# Patient Record
Sex: FEMALE | Race: ASIAN | NOT HISPANIC OR LATINO | URBAN - METROPOLITAN AREA
[De-identification: names, ages, dates, MRNs, and addresses within clinical notes are randomized per-mention and may not be internally consistent; named-entity substitution may affect disease eponyms.]

---

## 2017-09-08 ENCOUNTER — OUTPATIENT (OUTPATIENT)
Dept: OUTPATIENT SERVICES | Facility: HOSPITAL | Age: 52
LOS: 1 days | End: 2017-09-08
Payer: COMMERCIAL

## 2017-09-08 ENCOUNTER — APPOINTMENT (OUTPATIENT)
Dept: RADIOLOGY | Facility: HOSPITAL | Age: 52
End: 2017-09-08

## 2017-09-08 DIAGNOSIS — I10 ESSENTIAL (PRIMARY) HYPERTENSION: ICD-10-CM

## 2017-09-08 PROCEDURE — 71010: CPT | Mod: 26

## 2018-06-26 ENCOUNTER — APPOINTMENT (OUTPATIENT)
Dept: ELECTROPHYSIOLOGY | Facility: CLINIC | Age: 53
End: 2018-06-26
Payer: COMMERCIAL

## 2018-06-26 ENCOUNTER — NON-APPOINTMENT (OUTPATIENT)
Age: 53
End: 2018-06-26

## 2018-06-26 VITALS
HEIGHT: 64 IN | WEIGHT: 160 LBS | RESPIRATION RATE: 16 BRPM | OXYGEN SATURATION: 98 % | BODY MASS INDEX: 27.31 KG/M2 | HEART RATE: 69 BPM | DIASTOLIC BLOOD PRESSURE: 76 MMHG | SYSTOLIC BLOOD PRESSURE: 113 MMHG

## 2018-06-26 PROCEDURE — 93000 ELECTROCARDIOGRAM COMPLETE: CPT

## 2018-06-26 PROCEDURE — 99213 OFFICE O/P EST LOW 20 MIN: CPT

## 2018-09-18 ENCOUNTER — OUTPATIENT (OUTPATIENT)
Dept: OUTPATIENT SERVICES | Facility: HOSPITAL | Age: 53
LOS: 1 days | End: 2018-09-18

## 2018-09-18 ENCOUNTER — APPOINTMENT (OUTPATIENT)
Dept: CV DIAGNOSITCS | Facility: HOSPITAL | Age: 53
End: 2018-09-18
Payer: COMMERCIAL

## 2018-09-18 DIAGNOSIS — I48.92 UNSPECIFIED ATRIAL FLUTTER: ICD-10-CM

## 2018-09-18 PROCEDURE — 93306 TTE W/DOPPLER COMPLETE: CPT | Mod: 26

## 2019-02-15 ENCOUNTER — APPOINTMENT (OUTPATIENT)
Dept: ELECTROPHYSIOLOGY | Facility: CLINIC | Age: 54
End: 2019-02-15
Payer: COMMERCIAL

## 2019-02-15 ENCOUNTER — NON-APPOINTMENT (OUTPATIENT)
Age: 54
End: 2019-02-15

## 2019-02-15 VITALS
BODY MASS INDEX: 26.63 KG/M2 | WEIGHT: 156 LBS | RESPIRATION RATE: 16 BRPM | HEART RATE: 92 BPM | OXYGEN SATURATION: 98 % | SYSTOLIC BLOOD PRESSURE: 137 MMHG | DIASTOLIC BLOOD PRESSURE: 82 MMHG | HEIGHT: 64 IN

## 2019-02-15 PROCEDURE — 93000 ELECTROCARDIOGRAM COMPLETE: CPT

## 2019-02-15 PROCEDURE — 99213 OFFICE O/P EST LOW 20 MIN: CPT

## 2019-02-15 NOTE — DISCUSSION/SUMMARY
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 Patient's medications changed because of bradycardia which was likely due to interactions with other meds given.  Patient found to have high Hbg A1C and was considered diabetic which would increase CHADS2 vasc score to 4 (was previously 1 before CVA and DM diagnosis).  She is currently asymptomatic, except for some slowness with memory retrieval and speech.  Recommend diltiazem 240 daily and metoprolol 25 mg daily and Eliquis 5 mg twice daily. \par \par Return to work in 8 weeks. \par \par Sincerely,\par \par Justice Herrera MD\par \par Still pauses to speak, but 90 % of baseline.

## 2019-02-15 NOTE — HISTORY OF PRESENT ILLNESS
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 Patient's medications changed because of bradycardia which was likely due to interactions with other meds given.  Patient found to have high Hbg A1C and was considered diabetic which would increase CHADS2 vasc score to 4 (was previously 1 before CVA and DM diagnosis).  She is currently asymptomatic, except for some slowness with memory retrieval and speech.

## 2019-04-19 ENCOUNTER — APPOINTMENT (OUTPATIENT)
Dept: ELECTROPHYSIOLOGY | Facility: CLINIC | Age: 54
End: 2019-04-19
Payer: COMMERCIAL

## 2019-04-19 ENCOUNTER — NON-APPOINTMENT (OUTPATIENT)
Age: 54
End: 2019-04-19

## 2019-04-19 VITALS
HEIGHT: 64 IN | OXYGEN SATURATION: 98 % | BODY MASS INDEX: 26.46 KG/M2 | WEIGHT: 155 LBS | HEART RATE: 71 BPM | RESPIRATION RATE: 16 BRPM | DIASTOLIC BLOOD PRESSURE: 76 MMHG | SYSTOLIC BLOOD PRESSURE: 124 MMHG

## 2019-04-19 PROCEDURE — 93000 ELECTROCARDIOGRAM COMPLETE: CPT

## 2019-04-19 PROCEDURE — 99214 OFFICE O/P EST MOD 30 MIN: CPT

## 2019-04-19 RX ORDER — METFORMIN HYDROCHLORIDE 500 MG/1
500 TABLET, COATED ORAL
Refills: 0 | Status: ACTIVE | COMMUNITY
Start: 2019-04-19

## 2019-04-19 NOTE — DISCUSSION/SUMMARY
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 Patient's medications changed because of bradycardia which was likely due to interactions with other meds given. Patient found to have high Hbg A1C and was considered diabetic which would increase CHADS2 vasc score to 4 (was previously 1 before CVA and DM diagnosis). Did have some slowness with memory retrieval and speech, but that is resolved. She feels well and is asymptomatic with no chest pain, dyspnea, palpitations, lightheadedness, syncope or near syncope. Hears loud noise which is bothersome.   Will stay on AC, CCB and BB and will f/u in 6 months. She will undergone an echocardiogram, Will keep same dose of statin in light of history of stroke and NEM data regarding high dose statin and stroke. \par \par Sincerely,\par \par Justice Herrera MD

## 2019-04-19 NOTE — HISTORY OF PRESENT ILLNESS
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 Patient's medications changed because of bradycardia which was likely due to interactions with other meds given. Patient found to have high Hbg A1C and was considered diabetic which would increase CHADS2 vasc score to 4 (was previously 1 before CVA and DM diagnosis). Did have some slowness with memory retrieval and speech, but that is resolved. She feels well and is asymptomatic with no chest pain, dyspnea, palpitations, lightheadedness, syncope or near syncope. Hears loud noise which is bothersome.

## 2019-04-19 NOTE — PHYSICAL EXAM
[General Appearance - Well Developed] : well developed [Normal Appearance] : normal appearance [Well Groomed] : well groomed [General Appearance - Well Nourished] : well nourished [No Deformities] : no deformities [General Appearance - In No Acute Distress] : no acute distress [Normal Conjunctiva] : the conjunctiva exhibited no abnormalities [Eyelids - No Xanthelasma] : the eyelids demonstrated no xanthelasmas [Normal Oral Mucosa] : normal oral mucosa [No Oral Pallor] : no oral pallor [No Oral Cyanosis] : no oral cyanosis [Normal Jugular Venous A Waves Present] : normal jugular venous A waves present [Normal Jugular Venous V Waves Present] : normal jugular venous V waves present [No Jugular Venous Irene A Waves] : no jugular venous irene A waves [Respiration, Rhythm And Depth] : normal respiratory rhythm and effort [Exaggerated Use Of Accessory Muscles For Inspiration] : no accessory muscle use [Auscultation Breath Sounds / Voice Sounds] : lungs were clear to auscultation bilaterally [Heart Rate And Rhythm] : heart rate and rhythm were normal [Heart Sounds] : normal S1 and S2 [Murmurs] : no murmurs present [Abdomen Soft] : soft [Abdomen Tenderness] : non-tender [Abnormal Walk] : normal gait [Abdomen Mass (___ Cm)] : no abdominal mass palpated [Gait - Sufficient For Exercise Testing] : the gait was sufficient for exercise testing [Nail Clubbing] : no clubbing of the fingernails [Petechial Hemorrhages (___cm)] : no petechial hemorrhages [Cyanosis, Localized] : no localized cyanosis [Skin Color & Pigmentation] : normal skin color and pigmentation [] : no rash [No Venous Stasis] : no venous stasis [Skin Lesions] : no skin lesions [No Xanthoma] : no  xanthoma was observed [No Skin Ulcers] : no skin ulcer [Oriented To Time, Place, And Person] : oriented to person, place, and time [Affect] : the affect was normal [No Anxiety] : not feeling anxious [Mood] : the mood was normal [FreeTextEntry1] : glasses

## 2019-10-23 ENCOUNTER — RX RENEWAL (OUTPATIENT)
Age: 54
End: 2019-10-23

## 2019-11-08 ENCOUNTER — OUTPATIENT (OUTPATIENT)
Dept: OUTPATIENT SERVICES | Facility: HOSPITAL | Age: 54
LOS: 1 days | End: 2019-11-08

## 2019-11-08 ENCOUNTER — APPOINTMENT (OUTPATIENT)
Dept: CV DIAGNOSITCS | Facility: HOSPITAL | Age: 54
End: 2019-11-08
Payer: COMMERCIAL

## 2019-11-08 DIAGNOSIS — I34.0 NONRHEUMATIC MITRAL (VALVE) INSUFFICIENCY: ICD-10-CM

## 2019-11-08 DIAGNOSIS — I48.92 UNSPECIFIED ATRIAL FLUTTER: ICD-10-CM

## 2019-11-08 PROCEDURE — 93306 TTE W/DOPPLER COMPLETE: CPT | Mod: 26

## 2019-11-26 ENCOUNTER — NON-APPOINTMENT (OUTPATIENT)
Age: 54
End: 2019-11-26

## 2019-11-26 ENCOUNTER — APPOINTMENT (OUTPATIENT)
Dept: ELECTROPHYSIOLOGY | Facility: CLINIC | Age: 54
End: 2019-11-26
Payer: COMMERCIAL

## 2019-11-26 VITALS
HEART RATE: 77 BPM | DIASTOLIC BLOOD PRESSURE: 82 MMHG | WEIGHT: 171 LBS | BODY MASS INDEX: 29.19 KG/M2 | OXYGEN SATURATION: 97 % | HEIGHT: 64 IN | SYSTOLIC BLOOD PRESSURE: 127 MMHG

## 2019-11-26 DIAGNOSIS — I48.4 ATYPICAL ATRIAL FLUTTER: ICD-10-CM

## 2019-11-26 DIAGNOSIS — Z86.73 PERSONAL HISTORY OF TRANSIENT ISCHEMIC ATTACK (TIA), AND CEREBRAL INFARCTION W/OUT RESIDUAL DEFICITS: ICD-10-CM

## 2019-11-26 PROCEDURE — 93000 ELECTROCARDIOGRAM COMPLETE: CPT

## 2019-11-26 PROCEDURE — 99213 OFFICE O/P EST LOW 20 MIN: CPT

## 2019-11-26 RX ORDER — PRAVASTATIN SODIUM 80 MG/1
80 TABLET ORAL
Qty: 90 | Refills: 1 | Status: ACTIVE | COMMUNITY
Start: 2019-11-26 | End: 1900-01-01

## 2019-11-26 RX ORDER — ATORVASTATIN CALCIUM 80 MG/1
80 TABLET, FILM COATED ORAL DAILY
Qty: 90 | Refills: 2 | Status: DISCONTINUED | COMMUNITY
Start: 2019-11-26 | End: 2019-11-26

## 2019-11-28 NOTE — PHYSICAL EXAM
[General Appearance - Well Developed] : well developed [Well Groomed] : well groomed [Normal Appearance] : normal appearance [No Deformities] : no deformities [General Appearance - In No Acute Distress] : no acute distress [General Appearance - Well Nourished] : well nourished [Normal Conjunctiva] : the conjunctiva exhibited no abnormalities [Eyelids - No Xanthelasma] : the eyelids demonstrated no xanthelasmas [No Oral Pallor] : no oral pallor [Normal Oral Mucosa] : normal oral mucosa [No Oral Cyanosis] : no oral cyanosis [Normal Jugular Venous A Waves Present] : normal jugular venous A waves present [Normal Jugular Venous V Waves Present] : normal jugular venous V waves present [No Jugular Venous Irene A Waves] : no jugular venous irene A waves [Respiration, Rhythm And Depth] : normal respiratory rhythm and effort [Exaggerated Use Of Accessory Muscles For Inspiration] : no accessory muscle use [Auscultation Breath Sounds / Voice Sounds] : lungs were clear to auscultation bilaterally [Heart Rate And Rhythm] : heart rate and rhythm were normal [Murmurs] : no murmurs present [Heart Sounds] : normal S1 and S2 [Abdomen Tenderness] : non-tender [Abdomen Soft] : soft [Abdomen Mass (___ Cm)] : no abdominal mass palpated [Abnormal Walk] : normal gait [Cyanosis, Localized] : no localized cyanosis [Gait - Sufficient For Exercise Testing] : the gait was sufficient for exercise testing [Nail Clubbing] : no clubbing of the fingernails [Skin Color & Pigmentation] : normal skin color and pigmentation [Petechial Hemorrhages (___cm)] : no petechial hemorrhages [No Venous Stasis] : no venous stasis [] : no rash [Skin Lesions] : no skin lesions [No Xanthoma] : no  xanthoma was observed [No Skin Ulcers] : no skin ulcer [Affect] : the affect was normal [Oriented To Time, Place, And Person] : oriented to person, place, and time [Mood] : the mood was normal [No Anxiety] : not feeling anxious [FreeTextEntry1] : glasses

## 2019-11-28 NOTE — DISCUSSION/SUMMARY
[FreeTextEntry1] : In summary, Edna Denise is a 52y/o woman with Hx of permanent atrial fibrillation/atypical atrial flutter, and CVA who presents today for routine f/u. On last visit, she had recently developed a CVA but received TPA with near complete resolution of symptoms. Has been maintained on diltiazem, metoprolol, and Eliquis. Has been feeling well. Notes some palpitations if she is late to take a dose of diltiazem. Denies chest pain, SOB, syncope or near syncope. EKG today shows persistent afib/aflutter. Resume diltiazem, metoprolol, and Eliquis as prescribed. Rx provided. Continue f/u with Cardiologist and RTO for f/u in 6 months.\par \par Sincerely,\par \par Justice Herrera MD\par

## 2019-11-28 NOTE — HISTORY OF PRESENT ILLNESS
[FreeTextEntry1] : Gissell Cervantes MD\par \par Edna Denise is a 52y/o woman with Hx of permanent atrial fibrillation/atypical atrial flutter, and CVA who presents today for routine f/u. On last visit, she had recently developed a CVA but received TPA with near complete resolution of symptoms. Has been maintained on diltiazem, metoprolol, and Eliquis. Has been feeling well. Notes some palpitations if she is late to take a dose of diltiazem. Denies chest pain, SOB, syncope or near syncope.\par

## 2020-08-24 ENCOUNTER — RX RENEWAL (OUTPATIENT)
Age: 55
End: 2020-08-24

## 2021-02-26 ENCOUNTER — APPOINTMENT (OUTPATIENT)
Dept: ELECTROPHYSIOLOGY | Facility: CLINIC | Age: 56
End: 2021-02-26
Payer: COMMERCIAL

## 2021-02-26 ENCOUNTER — NON-APPOINTMENT (OUTPATIENT)
Age: 56
End: 2021-02-26

## 2021-02-26 VITALS
OXYGEN SATURATION: 98 % | HEART RATE: 85 BPM | BODY MASS INDEX: 28.32 KG/M2 | TEMPERATURE: 96.6 F | DIASTOLIC BLOOD PRESSURE: 78 MMHG | HEIGHT: 64 IN | SYSTOLIC BLOOD PRESSURE: 125 MMHG

## 2021-02-26 VITALS — BODY MASS INDEX: 28.32 KG/M2 | WEIGHT: 165 LBS

## 2021-02-26 DIAGNOSIS — I48.19 OTHER PERSISTENT ATRIAL FIBRILLATION: ICD-10-CM

## 2021-02-26 PROCEDURE — 99072 ADDL SUPL MATRL&STAF TM PHE: CPT

## 2021-02-26 PROCEDURE — 99213 OFFICE O/P EST LOW 20 MIN: CPT

## 2021-02-27 NOTE — DISCUSSION/SUMMARY
[FreeTextEntry1] : Edna Denise is a 56y/o woman with Hx of HLD, DMII, permanent atrial fibrillation/atypical atrial flutter, and CVA who presents today for routine f/u. \par \par Impression:\par \par 1. Permanent afib: EKG performed today to assess for presence of afib and reveals afib well rate controlled. [id number] score 4. Resume Eliquis for thromboembolic prophylaxis as prescribed and diltiazem/metoprolol for rate control management. No recent ECHO, last one in 2019 with LVEF 63%. Rx for ECHO provided. \par \par 2. HLD: resume statin therapy as prescribed and regular f/u with Cardiologist for routine lipid monitoring and management.\par \par 3. DMII: resume oral antihyperglycemic agents as prescribed and regular f/u with PCP for routine glucose monitoring and management.\par \par Sincerely,\par \par Justice Herrera MD

## 2021-02-27 NOTE — HISTORY OF PRESENT ILLNESS
[FreeTextEntry1] : Gissell Cervantes MD\par \par Edna Denise is a 54y/o woman with Hx of HLD, DMII, permanent atrial fibrillation/atypical atrial flutter, and CVA who presents today for routine f/u. On last visit, she had recently developed a CVA but received TPA with near complete resolution of symptoms. Has been maintained on diltiazem, metoprolol, and Eliquis. Has been feeling well. Notes some palpitations if she is late to take a dose of diltiazem. Did have one day at work where she flet palpitations and noted her heart rate to be in the 100s after eating a large lunch and feeling kind of stressed. Took an extra dose of metoprolol at that time with relief. Denies chest pain, SOB, syncope or near syncope.\par

## 2021-02-27 NOTE — PHYSICAL EXAM
[General Appearance - Well Developed] : well developed [Normal Appearance] : normal appearance [Well Groomed] : well groomed [General Appearance - Well Nourished] : well nourished [No Deformities] : no deformities [General Appearance - In No Acute Distress] : no acute distress [Normal Conjunctiva] : the conjunctiva exhibited no abnormalities [Eyelids - No Xanthelasma] : the eyelids demonstrated no xanthelasmas [Normal Oral Mucosa] : normal oral mucosa [No Oral Pallor] : no oral pallor [No Oral Cyanosis] : no oral cyanosis [Normal Jugular Venous A Waves Present] : normal jugular venous A waves present [Normal Jugular Venous V Waves Present] : normal jugular venous V waves present [No Jugular Venous Irene A Waves] : no jugular venous irene A waves [Respiration, Rhythm And Depth] : normal respiratory rhythm and effort [Exaggerated Use Of Accessory Muscles For Inspiration] : no accessory muscle use [Auscultation Breath Sounds / Voice Sounds] : lungs were clear to auscultation bilaterally [Heart Rate And Rhythm] : heart rate and rhythm were normal [Heart Sounds] : normal S1 and S2 [Murmurs] : no murmurs present [Abdomen Soft] : soft [Abdomen Tenderness] : non-tender [Abdomen Mass (___ Cm)] : no abdominal mass palpated [Abnormal Walk] : normal gait [Gait - Sufficient For Exercise Testing] : the gait was sufficient for exercise testing [Nail Clubbing] : no clubbing of the fingernails [Cyanosis, Localized] : no localized cyanosis [Petechial Hemorrhages (___cm)] : no petechial hemorrhages [Skin Color & Pigmentation] : normal skin color and pigmentation [] : no rash [No Venous Stasis] : no venous stasis [Skin Lesions] : no skin lesions [No Skin Ulcers] : no skin ulcer [No Xanthoma] : no  xanthoma was observed [Oriented To Time, Place, And Person] : oriented to person, place, and time [Affect] : the affect was normal [Mood] : the mood was normal [No Anxiety] : not feeling anxious [FreeTextEntry1] : glasses

## 2021-03-22 ENCOUNTER — APPOINTMENT (OUTPATIENT)
Dept: CV DIAGNOSITCS | Facility: HOSPITAL | Age: 56
End: 2021-03-22
Payer: COMMERCIAL

## 2021-03-22 ENCOUNTER — OUTPATIENT (OUTPATIENT)
Dept: OUTPATIENT SERVICES | Facility: HOSPITAL | Age: 56
LOS: 1 days | End: 2021-03-22

## 2021-03-22 DIAGNOSIS — I48.19 OTHER PERSISTENT ATRIAL FIBRILLATION: ICD-10-CM

## 2021-03-22 PROCEDURE — 93306 TTE W/DOPPLER COMPLETE: CPT | Mod: 26

## 2021-06-18 ENCOUNTER — APPOINTMENT (OUTPATIENT)
Dept: ELECTROPHYSIOLOGY | Facility: CLINIC | Age: 56
End: 2021-06-18

## 2021-10-08 ENCOUNTER — NON-APPOINTMENT (OUTPATIENT)
Age: 56
End: 2021-10-08

## 2021-10-08 ENCOUNTER — RESULT REVIEW (OUTPATIENT)
Age: 56
End: 2021-10-08

## 2021-10-08 ENCOUNTER — APPOINTMENT (OUTPATIENT)
Dept: ELECTROPHYSIOLOGY | Facility: CLINIC | Age: 56
End: 2021-10-08
Payer: COMMERCIAL

## 2021-10-08 VITALS — SYSTOLIC BLOOD PRESSURE: 147 MMHG | HEIGHT: 64 IN | BODY MASS INDEX: 27.29 KG/M2 | DIASTOLIC BLOOD PRESSURE: 94 MMHG

## 2021-10-08 VITALS — DIASTOLIC BLOOD PRESSURE: 103 MMHG | SYSTOLIC BLOOD PRESSURE: 142 MMHG | OXYGEN SATURATION: 98 % | HEART RATE: 101 BPM

## 2021-10-08 VITALS — WEIGHT: 159 LBS | BODY MASS INDEX: 27.29 KG/M2

## 2021-10-08 DIAGNOSIS — F41.9 ANXIETY DISORDER, UNSPECIFIED: ICD-10-CM

## 2021-10-08 PROCEDURE — 99214 OFFICE O/P EST MOD 30 MIN: CPT

## 2021-10-08 PROCEDURE — 93000 ELECTROCARDIOGRAM COMPLETE: CPT

## 2021-10-09 NOTE — HISTORY OF PRESENT ILLNESS
[FreeTextEntry1] : Gissell Cervantes MD\par \par Edna Denise is a 56y/o woman with Hx of HLD, DMII,atypical atrial flutter, and CVA who presents today for routine f/u.  She previously developed a CVA but received TPA with near complete resolution of symptoms. Has been maintained on diltiazem, metoprolol, and Eliquis. Has been feeling well. Notes some palpitations if she is late to take a dose of diltiazem. Did have one day at work where she felt palpitations and noted her heart rate to be in the 100s after eating a large lunch and feeling kind of stressed. Took an extra dose of metoprolol at that time with relief. Denies chest pain, SOB, syncope or near syncope. Has been diagnosed with DCIS (ductal carcinoma in situ) and previous biopsy was complicated by hematoma. Scheduled for mastectomy 10/22.

## 2021-10-09 NOTE — PHYSICAL EXAM
[Well Developed] : well developed [Well Nourished] : well nourished [No Acute Distress] : no acute distress [Normal Venous Pressure] : normal venous pressure [No Carotid Bruit] : no carotid bruit [Normal S1, S2] : normal S1, S2 [No Murmur] : no murmur [No Rub] : no rub [No Gallop] : no gallop [Clear Lung Fields] : clear lung fields [Good Air Entry] : good air entry [No Respiratory Distress] : no respiratory distress  [Soft] : abdomen soft [Non Tender] : non-tender [No Masses/organomegaly] : no masses/organomegaly [Normal Bowel Sounds] : normal bowel sounds [Normal Gait] : normal gait [No Edema] : no edema [No Cyanosis] : no cyanosis [No Clubbing] : no clubbing [No Varicosities] : no varicosities [No Rash] : no rash [No Skin Lesions] : no skin lesions [Moves all extremities] : moves all extremities [No Focal Deficits] : no focal deficits [Normal Speech] : normal speech [Alert and Oriented] : alert and oriented [Normal memory] : normal memory [General Appearance - Well Developed] : well developed [Normal Appearance] : normal appearance [Well Groomed] : well groomed [General Appearance - Well Nourished] : well nourished [No Deformities] : no deformities [General Appearance - In No Acute Distress] : no acute distress [Normal Conjunctiva] : the conjunctiva exhibited no abnormalities [Eyelids - No Xanthelasma] : the eyelids demonstrated no xanthelasmas [Normal Oral Mucosa] : normal oral mucosa [No Oral Pallor] : no oral pallor [No Oral Cyanosis] : no oral cyanosis [Normal Jugular Venous A Waves Present] : normal jugular venous A waves present [Normal Jugular Venous V Waves Present] : normal jugular venous V waves present [No Jugular Venous Irene A Waves] : no jugular venous irene A waves [Respiration, Rhythm And Depth] : normal respiratory rhythm and effort [Exaggerated Use Of Accessory Muscles For Inspiration] : no accessory muscle use [Auscultation Breath Sounds / Voice Sounds] : lungs were clear to auscultation bilaterally [Heart Rate And Rhythm] : heart rate and rhythm were normal [Heart Sounds] : normal S1 and S2 [Murmurs] : no murmurs present [Abdomen Soft] : soft [Abdomen Tenderness] : non-tender [Abdomen Mass (___ Cm)] : no abdominal mass palpated [Abnormal Walk] : normal gait [Gait - Sufficient For Exercise Testing] : the gait was sufficient for exercise testing [Nail Clubbing] : no clubbing of the fingernails [Cyanosis, Localized] : no localized cyanosis [Petechial Hemorrhages (___cm)] : no petechial hemorrhages [Skin Color & Pigmentation] : normal skin color and pigmentation [] : no rash [No Venous Stasis] : no venous stasis [Skin Lesions] : no skin lesions [No Skin Ulcers] : no skin ulcer [No Xanthoma] : no  xanthoma was observed [Oriented To Time, Place, And Person] : oriented to person, place, and time [Affect] : the affect was normal [Mood] : the mood was normal [No Anxiety] : not feeling anxious [FreeTextEntry1] : glasses

## 2021-10-09 NOTE — DISCUSSION/SUMMARY
[FreeTextEntry1] : Edna Denise is a 54y/o woman with Hx of HLD, DMII, permanent atrial fibrillation/atypical atrial flutter, and CVA who presents today for routine f/u. \par \par Impression:\par \par 1. Atypical atrial flutter (likely mitral annular flutter): EKG performed today to assess for presence of afib and reveals atypical atrial flutter with well rate controlled. [id number] score 4. Resume Eliquis for thromboembolic prophylaxis as prescribed and diltiazem/metoprolol for rate control management. No recent ECHO, last one in 2019 with LVEF 63%. Rx for ECHO provided. Dr. York: ([phone] fax) ([phone] office number)\par \par 2. HLD: resume statin therapy as prescribed and regular f/u with Cardiologist for routine lipid monitoring and management.\par \par 3. DMII: resume oral antihyperglycemic agents as prescribed and regular f/u with PCP for routine glucose monitoring and management.\par \par 4. Anxiety. Xanax to get her through this month until surgery performed. \par \par 5. DCIS: Patient has decided to undergo mastectomy because she is anxious about recurrence.  She is going to also inquire about double mastectomy.  She will have the procedure performed at [facility] on October 22 and she will likely stay over night pain management. She will stop AC 2 days before and restart anticoagulation as soon as possible post procedure. Patient at acceptable risk for procedure because of magnitude of adverse outcome if DCIS is not removed.  Patient is at risk for CVA the longer she is off AC and therefore it is imperative that anticoagulation is started as soon as possible post procedure. \par \par Brooklynn York: breast surgeon. \par \par \par Sincerely,\par \par Justice Herrera MD

## 2021-10-12 ENCOUNTER — OUTPATIENT (OUTPATIENT)
Dept: OUTPATIENT SERVICES | Facility: HOSPITAL | Age: 56
LOS: 1 days | End: 2021-10-12

## 2021-10-12 VITALS
HEIGHT: 62 IN | HEART RATE: 68 BPM | SYSTOLIC BLOOD PRESSURE: 134 MMHG | WEIGHT: 158.07 LBS | OXYGEN SATURATION: 99 % | TEMPERATURE: 97 F | RESPIRATION RATE: 16 BRPM | DIASTOLIC BLOOD PRESSURE: 80 MMHG

## 2021-10-12 DIAGNOSIS — D05.12 INTRADUCTAL CARCINOMA IN SITU OF LEFT BREAST: ICD-10-CM

## 2021-10-12 DIAGNOSIS — I48.91 UNSPECIFIED ATRIAL FIBRILLATION: ICD-10-CM

## 2021-10-12 DIAGNOSIS — D05.10 INTRADUCTAL CARCINOMA IN SITU OF UNSPECIFIED BREAST: ICD-10-CM

## 2021-10-12 DIAGNOSIS — Z98.890 OTHER SPECIFIED POSTPROCEDURAL STATES: Chronic | ICD-10-CM

## 2021-10-12 DIAGNOSIS — E11.9 TYPE 2 DIABETES MELLITUS WITHOUT COMPLICATIONS: ICD-10-CM

## 2021-10-12 NOTE — H&P PST ADULT - PROBLEM SELECTOR PLAN 2
Pending Cardiac eval as per surgeon request-copy in chart.  Last day of eliquis on 10/19/21 as per cardiologist. Patient verbalized understanding.  Patient instructed to take metoprolol with a sip of water on the morning of procedure.

## 2021-10-12 NOTE — H&P PST ADULT - NSICDXPASTMEDICALHX_GEN_ALL_CORE_FT
PAST MEDICAL HISTORY:  Afib     CVA (cerebrovascular accident) 2019 no residue    Hyperlipidemia     Lumbar scoliosis

## 2021-10-12 NOTE — H&P PST ADULT - PROBLEM SELECTOR PLAN 1
Patient tentatively scheduled for  left mastectomy left sentinel node biopsy 10/22/21.  Pre-op instructions provided. Pt given verbal and written instructions with teach back on chlorhexidine shampoo and pepcid. Pt verbalized understanding with return demonstration.   Patient instructed to call surgeon's office to schedule a COVID-19 test  prior to procedure.

## 2021-10-12 NOTE — H&P PST ADULT - GENITOURINARY
Review of Systems   Constitutional: Negative  HENT: Positive for tinnitus  Negative for hearing loss  Eyes: Negative  Respiratory: Negative  Cardiovascular: Negative  Gastrointestinal: Negative  Endocrine: Negative  Genitourinary: Negative  Musculoskeletal: Negative  Skin: Negative  Allergic/Immunologic: Negative  Neurological: Negative  Hematological: Negative  Psychiatric/Behavioral: Negative  details…

## 2021-10-12 NOTE — H&P PST ADULT - PROBLEM SELECTOR PLAN 3
PMH of DM  Patient instructed to hold Metformin the night before and the morning of procedure. Pt stated understanding.

## 2021-10-12 NOTE — H&P PST ADULT - NSANTHOSAYNRD_GEN_A_CORE
No. REGGIE screening performed.  STOP BANG Legend: 0-2 = LOW Risk; 3-4 = INTERMEDIATE Risk; 5-8 = HIGH Risk

## 2021-10-12 NOTE — H&P PST ADULT - HISTORY OF PRESENT ILLNESS
55 year old male with PMH of  HLD, Type 2 DM, Afib , CVA (2019 no residue)  presents to Presurgical testing with diagnosis of intraductal carcinoma insitu of left breast scheduled for left mastectomy left sentinel node biopsy. Patient reports that she had her regular mammogram states  that found to have DCIS.     Patient currently on antibiotics s/p tooth extraction last day today.

## 2021-10-12 NOTE — H&P PST ADULT - NSICDXPASTSURGICALHX_GEN_ALL_CORE_FT
PAST SURGICAL HISTORY:  S/P ablation of atrial flutter 2011    S/P CLARISSE (Total Abdominal Hysterectomy)

## 2021-10-13 PROBLEM — I48.91 UNSPECIFIED ATRIAL FIBRILLATION: Chronic | Status: ACTIVE | Noted: 2021-10-12

## 2021-10-13 PROBLEM — E78.5 HYPERLIPIDEMIA, UNSPECIFIED: Chronic | Status: ACTIVE | Noted: 2021-10-12

## 2021-10-13 PROBLEM — M41.9 SCOLIOSIS, UNSPECIFIED: Chronic | Status: ACTIVE | Noted: 2021-10-12

## 2021-10-13 PROBLEM — I63.9 CEREBRAL INFARCTION, UNSPECIFIED: Chronic | Status: ACTIVE | Noted: 2021-10-12

## 2021-10-18 DIAGNOSIS — Z01.818 ENCOUNTER FOR OTHER PREPROCEDURAL EXAMINATION: ICD-10-CM

## 2021-10-19 ENCOUNTER — APPOINTMENT (OUTPATIENT)
Dept: DISASTER EMERGENCY | Facility: CLINIC | Age: 56
End: 2021-10-19

## 2021-10-20 LAB — SARS-COV-2 N GENE NPH QL NAA+PROBE: NOT DETECTED

## 2021-10-21 ENCOUNTER — TRANSCRIPTION ENCOUNTER (OUTPATIENT)
Age: 56
End: 2021-10-21

## 2021-10-22 ENCOUNTER — APPOINTMENT (OUTPATIENT)
Dept: NUCLEAR MEDICINE | Facility: HOSPITAL | Age: 56
End: 2021-10-22

## 2021-10-22 ENCOUNTER — INPATIENT (INPATIENT)
Facility: HOSPITAL | Age: 56
LOS: 0 days | Discharge: HOME HEALTH | End: 2021-10-23
Attending: SURGERY | Admitting: SURGERY
Payer: COMMERCIAL

## 2021-10-22 ENCOUNTER — RESULT REVIEW (OUTPATIENT)
Age: 56
End: 2021-10-22

## 2021-10-22 VITALS
RESPIRATION RATE: 16 BRPM | HEART RATE: 90 BPM | SYSTOLIC BLOOD PRESSURE: 139 MMHG | OXYGEN SATURATION: 97 % | DIASTOLIC BLOOD PRESSURE: 67 MMHG | WEIGHT: 158.07 LBS | HEIGHT: 62 IN | TEMPERATURE: 99 F

## 2021-10-22 DIAGNOSIS — Z98.890 OTHER SPECIFIED POSTPROCEDURAL STATES: Chronic | ICD-10-CM

## 2021-10-22 DIAGNOSIS — D05.12 INTRADUCTAL CARCINOMA IN SITU OF LEFT BREAST: ICD-10-CM

## 2021-10-22 LAB
GLUCOSE BLDC GLUCOMTR-MCNC: 119 MG/DL — HIGH (ref 70–99)
GLUCOSE BLDC GLUCOMTR-MCNC: 149 MG/DL — HIGH (ref 70–99)
GLUCOSE BLDC GLUCOMTR-MCNC: 190 MG/DL — HIGH (ref 70–99)

## 2021-10-22 PROCEDURE — 88305 TISSUE EXAM BY PATHOLOGIST: CPT | Mod: 26

## 2021-10-22 PROCEDURE — 88307 TISSUE EXAM BY PATHOLOGIST: CPT | Mod: 26

## 2021-10-22 RX ORDER — APIXABAN 2.5 MG/1
1 TABLET, FILM COATED ORAL
Qty: 0 | Refills: 0 | DISCHARGE

## 2021-10-22 RX ORDER — DEXTROSE 50 % IN WATER 50 %
25 SYRINGE (ML) INTRAVENOUS ONCE
Refills: 0 | Status: DISCONTINUED | OUTPATIENT
Start: 2021-10-22 | End: 2021-10-23

## 2021-10-22 RX ORDER — CEFAZOLIN SODIUM 1 G
1000 VIAL (EA) INJECTION EVERY 12 HOURS
Refills: 0 | Status: DISCONTINUED | OUTPATIENT
Start: 2021-10-22 | End: 2021-10-23

## 2021-10-22 RX ORDER — METFORMIN HYDROCHLORIDE 850 MG/1
1 TABLET ORAL
Qty: 0 | Refills: 0 | DISCHARGE

## 2021-10-22 RX ORDER — DEXTROSE 50 % IN WATER 50 %
12.5 SYRINGE (ML) INTRAVENOUS ONCE
Refills: 0 | Status: DISCONTINUED | OUTPATIENT
Start: 2021-10-22 | End: 2021-10-23

## 2021-10-22 RX ORDER — METOCLOPRAMIDE HCL 10 MG
10 TABLET ORAL ONCE
Refills: 0 | Status: DISCONTINUED | OUTPATIENT
Start: 2021-10-22 | End: 2021-10-22

## 2021-10-22 RX ORDER — OXYCODONE HYDROCHLORIDE 5 MG/1
5 TABLET ORAL ONCE
Refills: 0 | Status: DISCONTINUED | OUTPATIENT
Start: 2021-10-22 | End: 2021-10-22

## 2021-10-22 RX ORDER — HYDROMORPHONE HYDROCHLORIDE 2 MG/ML
0.25 INJECTION INTRAMUSCULAR; INTRAVENOUS; SUBCUTANEOUS
Refills: 0 | Status: DISCONTINUED | OUTPATIENT
Start: 2021-10-22 | End: 2021-10-22

## 2021-10-22 RX ORDER — METOPROLOL TARTRATE 50 MG
1 TABLET ORAL
Qty: 0 | Refills: 0 | DISCHARGE

## 2021-10-22 RX ORDER — GLUCAGON INJECTION, SOLUTION 0.5 MG/.1ML
1 INJECTION, SOLUTION SUBCUTANEOUS ONCE
Refills: 0 | Status: DISCONTINUED | OUTPATIENT
Start: 2021-10-22 | End: 2021-10-23

## 2021-10-22 RX ORDER — ACETAMINOPHEN 500 MG
1000 TABLET ORAL EVERY 6 HOURS
Refills: 0 | Status: COMPLETED | OUTPATIENT
Start: 2021-10-22 | End: 2021-10-23

## 2021-10-22 RX ORDER — METOPROLOL TARTRATE 50 MG
25 TABLET ORAL DAILY
Refills: 0 | Status: DISCONTINUED | OUTPATIENT
Start: 2021-10-22 | End: 2021-10-23

## 2021-10-22 RX ORDER — ONDANSETRON 8 MG/1
4 TABLET, FILM COATED ORAL ONCE
Refills: 0 | Status: DISCONTINUED | OUTPATIENT
Start: 2021-10-22 | End: 2021-10-22

## 2021-10-22 RX ORDER — DILTIAZEM HCL 120 MG
240 CAPSULE, EXT RELEASE 24 HR ORAL DAILY
Refills: 0 | Status: DISCONTINUED | OUTPATIENT
Start: 2021-10-22 | End: 2021-10-22

## 2021-10-22 RX ORDER — SODIUM CHLORIDE 9 MG/ML
1000 INJECTION, SOLUTION INTRAVENOUS
Refills: 0 | Status: DISCONTINUED | OUTPATIENT
Start: 2021-10-22 | End: 2021-10-23

## 2021-10-22 RX ORDER — HYDROMORPHONE HYDROCHLORIDE 2 MG/ML
0.5 INJECTION INTRAMUSCULAR; INTRAVENOUS; SUBCUTANEOUS
Refills: 0 | Status: DISCONTINUED | OUTPATIENT
Start: 2021-10-22 | End: 2021-10-22

## 2021-10-22 RX ORDER — SODIUM CHLORIDE 9 MG/ML
1000 INJECTION, SOLUTION INTRAVENOUS
Refills: 0 | Status: DISCONTINUED | OUTPATIENT
Start: 2021-10-22 | End: 2021-10-22

## 2021-10-22 RX ORDER — DEXTROSE 50 % IN WATER 50 %
15 SYRINGE (ML) INTRAVENOUS ONCE
Refills: 0 | Status: DISCONTINUED | OUTPATIENT
Start: 2021-10-22 | End: 2021-10-23

## 2021-10-22 RX ORDER — DILTIAZEM HCL 120 MG
240 CAPSULE, EXT RELEASE 24 HR ORAL DAILY
Refills: 0 | Status: DISCONTINUED | OUTPATIENT
Start: 2021-10-22 | End: 2021-10-23

## 2021-10-22 RX ORDER — INSULIN LISPRO 100/ML
VIAL (ML) SUBCUTANEOUS AT BEDTIME
Refills: 0 | Status: DISCONTINUED | OUTPATIENT
Start: 2021-10-22 | End: 2021-10-23

## 2021-10-22 RX ORDER — INSULIN LISPRO 100/ML
VIAL (ML) SUBCUTANEOUS
Refills: 0 | Status: DISCONTINUED | OUTPATIENT
Start: 2021-10-22 | End: 2021-10-23

## 2021-10-22 RX ORDER — METOPROLOL TARTRATE 50 MG
25 TABLET ORAL DAILY
Refills: 0 | Status: DISCONTINUED | OUTPATIENT
Start: 2021-10-22 | End: 2021-10-22

## 2021-10-22 RX ORDER — DILTIAZEM HCL 120 MG
1 CAPSULE, EXT RELEASE 24 HR ORAL
Qty: 0 | Refills: 0 | DISCHARGE

## 2021-10-22 RX ADMIN — Medication 0: at 16:35

## 2021-10-22 RX ADMIN — Medication 240 MILLIGRAM(S): at 16:02

## 2021-10-22 RX ADMIN — Medication 100 MILLIGRAM(S): at 21:52

## 2021-10-22 RX ADMIN — SODIUM CHLORIDE 75 MILLILITER(S): 9 INJECTION, SOLUTION INTRAVENOUS at 21:51

## 2021-10-22 RX ADMIN — HYDROMORPHONE HYDROCHLORIDE 0.5 MILLIGRAM(S): 2 INJECTION INTRAMUSCULAR; INTRAVENOUS; SUBCUTANEOUS at 16:03

## 2021-10-22 RX ADMIN — HYDROMORPHONE HYDROCHLORIDE 0.5 MILLIGRAM(S): 2 INJECTION INTRAMUSCULAR; INTRAVENOUS; SUBCUTANEOUS at 16:45

## 2021-10-22 NOTE — BRIEF OPERATIVE NOTE - NSICDXBRIEFPROCEDURE_GEN_ALL_CORE_FT
PROCEDURES:  Simple mastectomy of left breast with sentinel node biopsy 22-Oct-2021 12:52:55  Uday Jimenez

## 2021-10-22 NOTE — CHART NOTE - NSCHARTNOTEFT_GEN_A_CORE
POST-OP NOTE    ABHI CARLOS | [id number] | [facility]    Procedure: s/p Left mastectomy with SLNB and reconstruction by Plastics    Subjective: Pt seen resting comfortably in PACU. Pt endorses mild pain across her left chest that is well controlled with PO medication. Pt denies n/v, fevers, chills, SOB, abdominal pain. VS wnl and patient has voided a few times since entering PACU.    Vital Signs Last 24 Hrs  T(C): 36.4 (22 Oct 2021 12:40), Max: 37.2 (22 Oct 2021 08:59)  T(F): 97.5 (22 Oct 2021 12:40), Max: 99 (22 Oct 2021 08:59)  HR: 87 (22 Oct 2021 16:30) (61 - 98)  BP: 132/75 (22 Oct 2021 16:30) (94/72 - 146/92)  BP(mean): 82 (22 Oct 2021 16:30) (77 - 106)  RR: 18 (22 Oct 2021 16:30) (12 - 21)  SpO2: 97% (22 Oct 2021 16:30) (93% - 100%)  I&O's Summary    22 Oct 2021 07:01  -  22 Oct 2021 16:48  --------------------------------------------------------  IN: 150 mL / OUT: 710 mL / NET: -560 mL        PHYSICAL EXAM:  Gen: NAD  Resp: breathing easily, no stridor  CV: RRR  Abdomen: soft, nontender, nondistended  Skin: Incision c/d/i. With 2 SUPRIYA drains appropriately positioned. Normal color, no rashes or lesions

## 2021-10-22 NOTE — PATIENT PROFILE ADULT - PRO INTERPRETER NEED 2
What Type Of Note Output Would You Prefer (Optional)?: Standard Output
What Is The Reason For Today's Visit?: Full Body Skin Examination
What Is The Reason For Today's Visit? (Being Monitored For X): concerning skin lesions on an annual basis
How Severe Are Your Spot(S)?: moderate
English

## 2021-10-23 ENCOUNTER — TRANSCRIPTION ENCOUNTER (OUTPATIENT)
Age: 56
End: 2021-10-23

## 2021-10-23 VITALS — HEART RATE: 83 BPM | DIASTOLIC BLOOD PRESSURE: 73 MMHG | SYSTOLIC BLOOD PRESSURE: 114 MMHG

## 2021-10-23 LAB
A1C WITH ESTIMATED AVERAGE GLUCOSE RESULT: 6.6 % — HIGH (ref 4–5.6)
ANION GAP SERPL CALC-SCNC: 15 MMOL/L — HIGH (ref 7–14)
BUN SERPL-MCNC: 11 MG/DL — SIGNIFICANT CHANGE UP (ref 7–23)
CALCIUM SERPL-MCNC: 9.1 MG/DL — SIGNIFICANT CHANGE UP (ref 8.4–10.5)
CHLORIDE SERPL-SCNC: 101 MMOL/L — SIGNIFICANT CHANGE UP (ref 98–107)
CO2 SERPL-SCNC: 22 MMOL/L — SIGNIFICANT CHANGE UP (ref 22–31)
CREAT SERPL-MCNC: 0.62 MG/DL — SIGNIFICANT CHANGE UP (ref 0.5–1.3)
ESTIMATED AVERAGE GLUCOSE: 143 — SIGNIFICANT CHANGE UP
GLUCOSE BLDC GLUCOMTR-MCNC: 136 MG/DL — HIGH (ref 70–99)
GLUCOSE BLDC GLUCOMTR-MCNC: 176 MG/DL — HIGH (ref 70–99)
GLUCOSE SERPL-MCNC: 212 MG/DL — HIGH (ref 70–99)
HCT VFR BLD CALC: 40.5 % — SIGNIFICANT CHANGE UP (ref 34.5–45)
HGB BLD-MCNC: 13.7 G/DL — SIGNIFICANT CHANGE UP (ref 11.5–15.5)
MAGNESIUM SERPL-MCNC: 2 MG/DL — SIGNIFICANT CHANGE UP (ref 1.6–2.6)
MCHC RBC-ENTMCNC: 30.4 PG — SIGNIFICANT CHANGE UP (ref 27–34)
MCHC RBC-ENTMCNC: 33.8 GM/DL — SIGNIFICANT CHANGE UP (ref 32–36)
MCV RBC AUTO: 90 FL — SIGNIFICANT CHANGE UP (ref 80–100)
NRBC # BLD: 0 /100 WBCS — SIGNIFICANT CHANGE UP
NRBC # FLD: 0 K/UL — SIGNIFICANT CHANGE UP
PHOSPHATE SERPL-MCNC: 3.4 MG/DL — SIGNIFICANT CHANGE UP (ref 2.5–4.5)
PLATELET # BLD AUTO: 279 K/UL — SIGNIFICANT CHANGE UP (ref 150–400)
POTASSIUM SERPL-MCNC: 4.2 MMOL/L — SIGNIFICANT CHANGE UP (ref 3.5–5.3)
POTASSIUM SERPL-SCNC: 4.2 MMOL/L — SIGNIFICANT CHANGE UP (ref 3.5–5.3)
RBC # BLD: 4.5 M/UL — SIGNIFICANT CHANGE UP (ref 3.8–5.2)
RBC # FLD: 13.6 % — SIGNIFICANT CHANGE UP (ref 10.3–14.5)
SODIUM SERPL-SCNC: 138 MMOL/L — SIGNIFICANT CHANGE UP (ref 135–145)
WBC # BLD: 9.2 K/UL — SIGNIFICANT CHANGE UP (ref 3.8–10.5)
WBC # FLD AUTO: 9.2 K/UL — SIGNIFICANT CHANGE UP (ref 3.8–10.5)

## 2021-10-23 RX ORDER — AMOXICILLIN 250 MG/5ML
1 SUSPENSION, RECONSTITUTED, ORAL (ML) ORAL
Qty: 0 | Refills: 0 | DISCHARGE

## 2021-10-23 RX ADMIN — Medication 1: at 12:19

## 2021-10-23 RX ADMIN — Medication 1000 MILLIGRAM(S): at 00:41

## 2021-10-23 RX ADMIN — Medication 240 MILLIGRAM(S): at 15:42

## 2021-10-23 RX ADMIN — Medication 400 MILLIGRAM(S): at 05:52

## 2021-10-23 RX ADMIN — Medication 400 MILLIGRAM(S): at 00:06

## 2021-10-23 RX ADMIN — Medication 25 MILLIGRAM(S): at 07:43

## 2021-10-23 RX ADMIN — Medication 1000 MILLIGRAM(S): at 06:48

## 2021-10-23 NOTE — DISCHARGE NOTE PROVIDER - NSDCMRMEDTOKEN_GEN_ALL_CORE_FT
amoxicillin 500 mg oral tablet: 1 tab(s) orally every 8 hours x 7 days LD 10/12/2021  DilTIAZem (Eqv-Dilacor XR) 240 mg/24 hours oral capsule, extended release: 1 cap(s) orally once a day  Eliquis 5 mg oral tablet: 1 tab(s) orally 2 times a day  metFORMIN 500 mg oral tablet: 1 tab(s) orally 2 times a day  metoprolol succinate 25 mg oral tablet, extended release: 1 tab(s) orally once a day  Pravachol 80 mg oral tablet: 1 tab(s) orally once a day   DilTIAZem (Eqv-Dilacor XR) 240 mg/24 hours oral capsule, extended release: 1 cap(s) orally once a day  Eliquis 5 mg oral tablet: 1 tab(s) orally 2 times a day  metFORMIN 500 mg oral tablet: 1 tab(s) orally 2 times a day  metoprolol succinate 25 mg oral tablet, extended release: 1 tab(s) orally once a day  Pravachol 80 mg oral tablet: 1 tab(s) orally once a day

## 2021-10-23 NOTE — DISCHARGE NOTE PROVIDER - CARE PROVIDER_API CALL
Brooklynn York  SURGERY  [address], [address]  [place] [address]  Phone: [phone]  Fax: [phone]  Established Patient  Follow Up Time: 2 weeks    Mario Velazquez  PLASTIC SURGERY  [address], [address]  [place] [address]  Phone: [phone]  Fax: [phone]  Established Patient  Follow Up Time: 2 weeks

## 2021-10-23 NOTE — DISCHARGE NOTE PROVIDER - NSDCCPTREATMENT_GEN_ALL_CORE_FT
PRINCIPAL PROCEDURE  Procedure: Simple mastectomy of left breast with sentinel node biopsy  Findings and Treatment:

## 2021-10-23 NOTE — DISCHARGE NOTE NURSING/CASE MANAGEMENT/SOCIAL WORK - PATIENT PORTAL LINK FT
You can access the FollowMyHealth Patient Portal offered by [facility] by registering at the following website: [email or web address]. By joining [facility]’s FollowMyHealth portal, you will also be able to view your health information using other applications (apps) compatible with our system.

## 2021-10-23 NOTE — ED CDU PROVIDER SUBSEQUENT DAY NOTE - PHYSICAL EXAMINATION
flaps viable, no erythema, fluctuance or discharge.  incision closed and prineo in place.  no collection.  drains working well.  drainage 25 each serosangiunous.    Hct 40.7    Plan is for:    1) discharge today with VNS for drain care unless patient declines.  2) resume normal oral regiment from preop EXCEPT NO ELIQUIS until 9 PM 10/24/21.  This was reviewed by phone with her cardiologist Dr. Herrera who concurs.    3) patient will fu with me on 11/1/21.

## 2021-10-23 NOTE — PROGRESS NOTE ADULT - SUBJECTIVE AND OBJECTIVE BOX
Surgical Oncology Progress Note     Subjective/24hour Events:   Patient seen and examined.   No acute events overnight.   Pain controlled.     Vital Signs:  Vital Signs Last 24 Hrs  T(C): 36.4 (23 Oct 2021 01:37), Max: 37.2 (22 Oct 2021 08:59)  T(F): 97.6 (23 Oct 2021 01:37), Max: 99 (22 Oct 2021 08:59)  HR: 91 (23 Oct 2021 01:37) (58 - 98)  BP: 128/84 (23 Oct 2021 01:37) (94/72 - 146/92)  BP(mean): 86 (22 Oct 2021 17:00) (77 - 106)  RR: 18 (23 Oct 2021 01:37) (10 - 21)  SpO2: 99% (23 Oct 2021 01:37) (93% - 100%)    CAPILLARY BLOOD GLUCOSE      POCT Blood Glucose.: 190 mg/dL (22 Oct 2021 21:32)  POCT Blood Glucose.: 149 mg/dL (22 Oct 2021 16:32)  POCT Blood Glucose.: 119 mg/dL (22 Oct 2021 09:18)      I&O's Detail    22 Oct 2021 07:01  -  23 Oct 2021 02:35  --------------------------------------------------------  IN:    IV PiggyBack: 150 mL    Lactated Ringers: 450 mL    Lactated Ringers: 30 mL    Oral Fluid: 280 mL  Total IN: 910 mL    OUT:    Bulb (mL): 25 mL    Bulb (mL): 25 mL    Voided (mL): 1600 mL  Total OUT: 1650 mL    Total NET: -740 mL          Physical Exam:  Gen:  CV:  Resp:  Chest:  Abd:  Ext:    Labs:       Surgical Oncology Progress Note     Subjective/24hour Events: Patient seen and examined on AM rounds. No acute events overnight. Pain controlled. Pt denies fevers, chills, n/v, CP, SOB.    Vital Signs:  Vital Signs Last 24 Hrs  T(C): 36.4 (23 Oct 2021 01:37), Max: 37.2 (22 Oct 2021 08:59)  T(F): 97.6 (23 Oct 2021 01:37), Max: 99 (22 Oct 2021 08:59)  HR: 91 (23 Oct 2021 01:37) (58 - 98)  BP: 128/84 (23 Oct 2021 01:37) (94/72 - 146/92)  BP(mean): 86 (22 Oct 2021 17:00) (77 - 106)  RR: 18 (23 Oct 2021 01:37) (10 - 21)  SpO2: 99% (23 Oct 2021 01:37) (93% - 100%)    CAPILLARY BLOOD GLUCOSE      POCT Blood Glucose.: 190 mg/dL (22 Oct 2021 21:32)  POCT Blood Glucose.: 149 mg/dL (22 Oct 2021 16:32)  POCT Blood Glucose.: 119 mg/dL (22 Oct 2021 09:18)      I&O's Detail    22 Oct 2021 07:01  -  23 Oct 2021 02:35  --------------------------------------------------------  IN:    IV PiggyBack: 150 mL    Lactated Ringers: 450 mL    Lactated Ringers: 30 mL    Oral Fluid: 280 mL  Total IN: 910 mL    OUT:    Bulb (mL): 25 mL    Bulb (mL): 25 mL    Voided (mL): 1600 mL  Total OUT: 1650 mL    Total NET: -740 mL      PHYSICAL EXAM:  Gen: NAD  Resp: breathing easily, no stridor  CV: RRR  Abdomen: soft, nontender, nondistended  Skin: Incision c/d/i. With 2 SUPRIYA drains appropriately positioned and draining. Normal color, no rashes or lesions.    LABS:                         13.7   9.20  )-----------( 279      ( 23 Oct 2021 09:31 )             40.5                     RADIOLOGY, EKG & ADDITIONAL TESTS: Reviewed.

## 2021-10-23 NOTE — DISCHARGE NOTE PROVIDER - PROVIDER TOKENS
PROVIDER:[TOKEN:[[id number]],FOLLOWUP:[2 weeks],ESTABLISHEDPATIENT:[T]],PROVIDER:[TOKEN:[[id number]],FOLLOWUP:[2 weeks],ESTABLISHEDPATIENT:[T]]

## 2021-10-23 NOTE — DISCHARGE NOTE PROVIDER - HOSPITAL COURSE
ABHI CARLOS is a 55y Female who was admitted to [facility] for L breast mastectomy, L SLNB, and Plastic Reconstruction surgery. Pt tolerated the surgery well with no acute complications in the post-operative period. Pt has 2 SUPRIYA drains, appropriately positioned and draining adequately. Pt to resume Eliquis the evening of 10/24. Pt was provided SUPRIYA drain education 10/23 and will be arranged for VNS.    At time of discharge, pt was tolerating a regular diet, voiding/stooling independently, ambulating, and pain was well-controlled. Patient and family felt ready for discharge.

## 2021-10-23 NOTE — DISCHARGE NOTE PROVIDER - NSDCCPCAREPLAN_GEN_ALL_CORE_FT
PRINCIPAL DISCHARGE DIAGNOSIS  Diagnosis: Intraductal carcinoma in situ  Assessment and Plan of Treatment:

## 2021-10-23 NOTE — DISCHARGE NOTE PROVIDER - NSDCQMAMI_CARD_ALL_CORE
CHIEF COMPLAINT  F/U back pain    Subjective   Iraida Elizabeth is a 74 y.o. female  who presents to the office for follow-up.She has a history of chronic back pain.    Procedure List:  LESI 12/12/18 -- NO relief from the procedure.   Bilateral L3-5 Lumbar Medial Branch Blockade 11-21-18 -- minimal relief  RIGHT L2-5 Lumbar Medial Branch Blockade 9-19-18 -- minimal relief   Right SI joint injection 8/21/2018 -- NO relief from the procedure     C/o back pain, severe, worsening.  Has been taking Hydrocodone 5/325 1-2/day, has been supplementing with Tylenol, Meloxicam helping (started a few months ago).  No constipation. Provides moderate relief.  was too drowsy with Hydrocodone 10/325.      Has previously failed physical therapy.      Scheduled to see Dr. Hdez next month due to worsening back pain.      RLE DVT diagnosed 3/24/19. On Xarelto now. Pain improving.  Noted pain medication from ED and PCP on DONNY.      Back Pain   This is a chronic (chronic) problem. The current episode started more than 1 year ago. The problem occurs constantly. The problem has been gradually worsening since onset. The pain is present in the lumbar spine and sacro-iliac. The pain radiates to the left foot, left thigh, left knee, right foot, right knee and right thigh. The pain is at a severity of 7/10. The pain is severe. The symptoms are aggravated by bending, position, standing and twisting (laying flat). Associated symptoms include weakness (bilateral legs). Pertinent negatives include no abdominal pain, chest pain, dysuria, fever, headaches or numbness (R leg). She has tried analgesics for the symptoms.      PEG Assessment   What number best describes your pain on average in the past week?9  What number best describes how, during the past week, pain has interfered with your enjoyment of life?9  What number best describes how, during the past week, pain has interfered with your general activity?  9    The following portions of  "the patient's history were reviewed and updated as appropriate: allergies, current medications, past family history, past medical history, past social history, past surgical history and problem list.    Review of Systems   Constitutional: Positive for activity change (very limited). Negative for appetite change, diaphoresis, fatigue and fever.   HENT: Positive for hearing loss. Negative for trouble swallowing.    Respiratory: Positive for shortness of breath (mild). Negative for apnea and chest tightness.    Cardiovascular: Positive for leg swelling. Negative for chest pain.   Gastrointestinal: Negative for abdominal pain, constipation, diarrhea (colitis) and nausea.   Genitourinary: Negative for difficulty urinating and dysuria.   Musculoskeletal: Positive for back pain.   Neurological: Positive for weakness (bilateral legs) and light-headedness. Negative for dizziness, seizures, numbness (R leg) and headaches.   Psychiatric/Behavioral: Positive for sleep disturbance. Negative for suicidal ideas. The patient is not nervous/anxious.      Vitals:    05/01/19 0947   BP: 137/71   Pulse: 90   Resp: 18   Temp: 98.3 °F (36.8 °C)   SpO2: 96%   Weight: 96 kg (211 lb 9.6 oz)   Height: 154.9 cm (61\")   PainSc:   7   PainLoc: Back     Objective   Physical Exam   Constitutional: She is oriented to person, place, and time. She appears well-developed and well-nourished. No distress.   HENT:   Head: Normocephalic and atraumatic.   Eyes: Conjunctivae and EOM are normal.   Neck: Neck supple.   Cardiovascular: Normal rate.   Pulmonary/Chest: Effort normal. No respiratory distress.   Musculoskeletal:        Lumbar back: She exhibits tenderness, bony tenderness and pain.   Neurological: She is alert and oriented to person, place, and time. She has normal strength. No sensory deficit. Gait (ambulating with cane) abnormal.   Skin: Skin is warm and dry.   Psychiatric: She has a normal mood and affect. Her behavior is normal.   Nursing " note and vitals reviewed.    Assessment/Plan   Iraida was seen today for back pain.    Diagnoses and all orders for this visit:    Other chronic pain    Chronic bilateral low back pain without sciatica    DDD (degenerative disc disease), lumbar    Encounter for long-term current use of high risk medication    Other orders  -     HYDROcodone-acetaminophen (NORCO) 5-325 MG per tablet; Take one po bid prn severe pain      --- Refill Hydrocodone. Patient appears stable with current regimen. No adverse effects. Regarding continuation of opioids, there is no evidence of aberrant behavior or any red flags.  The patient continues with appropriate response to opioid therapy. ADL's remain intact by self.   --- The urine drug screen confirmation from 8/14/18 has been reviewed and the result is appropriate based on patient history and DONNY report  --- Follow-up 2 months          DONNY REPORT  As part of the patient's treatment plan, I am prescribing controlled substances. The patient has been made aware of appropriate use of such medications, including potential risk of somnolence, limited ability to drive and/or work safely, and the potential for dependence or overdose. It has also bee made clear that these medications are for use by this patient only, without concomitant use of alcohol or other substances unless prescribed.     Patient has completed prescribing agreement detailing terms of continued prescribing of controlled substances, including monitoring DONNY reports, urine drug screening, and pill counts if necessary. The patient is aware that inappropriate use will results in cessation of prescribing such medications.    DONNY report has been reviewed and scanned into the patient's chart.    As the clinician, I personally reviewed the DONNY from 4/30/19 while the patient was in the office today.    History and physical exam exhibit continued safe and appropriate use of controlled substances.    EMR  Dragon/Transcription disclaimer:   Much of this encounter note is an electronic transcription/translation of spoken language to printed text. The electronic translation of spoken language may permit erroneous, or at times, nonsensical words or phrases to be inadvertently transcribed; Although I have reviewed the note for such errors, some may still exist.     No

## 2021-10-23 NOTE — PROGRESS NOTE ADULT - ASSESSMENT
Assessment      Plan:      D Team Surgery  [id number] Assessment: 55F w/hx DM2, Afib (on Eliquis), CVA, anxiety is s/p 10/22 L breast mastectomy, L SLNB, and PRS.    Plan:  - Pain control  - F/u w/CM re: drain care  - F/u w/Plastic Surgery regarding f/u visit and restarting Eliquis  - DC 10/23    D Team Surgery  [id number]

## 2021-10-23 NOTE — ED CDU PROVIDER SUBSEQUENT DAY NOTE - HISTORY
pod 1 after L mastectomy, SLNB, local flap closure.  feels well.  eating, ambulating and using BR without difficulty.

## 2021-10-23 NOTE — DISCHARGE NOTE PROVIDER - NSDCFUADDINST_GEN_ALL_CORE_FT
PAIN: You may continue to take Acetaminophen (Tylenol) and Ibuprofen (Advil, Motrin **IF 6 MONTHS OR OLDER) over the counter for pain as needed. You can alternate the two medications, giving one every 3 hours. You may take Oxycodone 5mg, as needed, for breakthrough pain every 6 hours.   WOUND CARE:  You should allow warm soapy water to run down the wound in the shower. You should not need to scrub the area. You do not have any stitches that need to be removed. If you have glue or steri-strips on your wound, it will fall off on its own.  BATHING: Please do not soak or submerge the wound in water (bath, swimming) for 14 days after your surgery.  ACTIVITY: No heavy lifting, straining, or vigorous activity until your follow-up appointment in 2 weeks.   NOTIFY US IF: You have any bleeding that does not stop, any pus draining from your wounds, any fever (over 100.4 F) or chills, persistent nausea/vomiting, persistent diarrhea, or if your pain is not controlled on the discharge pain medications.  FOLLOW-UP: Please call the office and make an appointment to follow up with Dr. York in 1-2 weeks and Dr. Velazquez in 1-2 weeks.  Please follow up with your primary care physician in 1-2 weeks regarding your hospitalization.  PAIN: You may continue to take Acetaminophen (Tylenol) and Ibuprofen (Advil, Motrin **IF 6 MONTHS OR OLDER) over the counter for pain as needed. You can alternate the two medications, giving one every 3 hours. You may take the prescribed pain medication as instructed.   WOUND CARE:  You should allow warm soapy water to run down the wound in the shower. You should not need to scrub the area. You do not have any stitches that need to be removed. If you have glue or steri-strips on your wound, it will fall off on its own.  BATHING: Please do not soak or submerge the wound in water (bath, swimming) for 14 days after your surgery.  ACTIVITY: No heavy lifting, straining, or vigorous activity until your follow-up appointment in 2 weeks.   NOTIFY US IF: You have any bleeding that does not stop, any pus draining from your wounds, any fever (over 100.4 F) or chills, persistent nausea/vomiting, persistent diarrhea, or if your pain is not controlled on the discharge pain medications.  FOLLOW-UP: Please call the office and make an appointment to follow up with Dr. York in 1-2 weeks and Dr. Velazquez in 1-2 weeks.  Please follow up with your primary care physician in 1-2 weeks regarding your hospitalization.

## 2021-10-27 LAB — SURGICAL PATHOLOGY STUDY: SIGNIFICANT CHANGE UP

## 2022-03-21 ENCOUNTER — RX RENEWAL (OUTPATIENT)
Age: 57
End: 2022-03-21

## 2022-06-01 ENCOUNTER — APPOINTMENT (OUTPATIENT)
Dept: DERMATOLOGY | Facility: CLINIC | Age: 57
End: 2022-06-01
Payer: COMMERCIAL

## 2022-06-01 VITALS — BODY MASS INDEX: 26.98 KG/M2 | HEIGHT: 64 IN | WEIGHT: 158 LBS

## 2022-06-01 PROCEDURE — 99203 OFFICE O/P NEW LOW 30 MIN: CPT | Mod: 25

## 2022-06-01 PROCEDURE — 17110 DESTRUCTION B9 LES UP TO 14: CPT

## 2022-06-17 ENCOUNTER — APPOINTMENT (OUTPATIENT)
Dept: ELECTROPHYSIOLOGY | Facility: CLINIC | Age: 57
End: 2022-06-17

## 2022-06-17 ENCOUNTER — NON-APPOINTMENT (OUTPATIENT)
Age: 57
End: 2022-06-17

## 2022-06-17 ENCOUNTER — APPOINTMENT (OUTPATIENT)
Dept: ELECTROPHYSIOLOGY | Facility: CLINIC | Age: 57
End: 2022-06-17
Payer: COMMERCIAL

## 2022-06-17 VITALS
SYSTOLIC BLOOD PRESSURE: 142 MMHG | DIASTOLIC BLOOD PRESSURE: 75 MMHG | BODY MASS INDEX: 26.98 KG/M2 | WEIGHT: 158 LBS | HEIGHT: 64 IN | OXYGEN SATURATION: 95 % | HEART RATE: 72 BPM

## 2022-06-17 DIAGNOSIS — R00.2 PALPITATIONS: ICD-10-CM

## 2022-06-17 DIAGNOSIS — I48.92 UNSPECIFIED ATRIAL FLUTTER: ICD-10-CM

## 2022-06-17 PROCEDURE — 93000 ELECTROCARDIOGRAM COMPLETE: CPT

## 2022-06-17 PROCEDURE — 99213 OFFICE O/P EST LOW 20 MIN: CPT

## 2022-06-17 RX ORDER — OMEGA-3/DHA/EPA/FISH OIL 300-1000MG
1000 CAPSULE ORAL DAILY
Refills: 0 | Status: ACTIVE | COMMUNITY
Start: 2022-06-17

## 2022-06-17 RX ORDER — ASCORBIC ACID 1000 MG
10 TABLET ORAL
Refills: 0 | Status: ACTIVE | COMMUNITY
Start: 2022-06-17

## 2022-06-17 RX ORDER — ALPRAZOLAM 0.5 MG/1
0.5 TABLET ORAL DAILY
Qty: 30 | Refills: 0 | Status: DISCONTINUED | COMMUNITY
Start: 2021-10-08 | End: 2022-06-17

## 2022-06-17 RX ORDER — FLUOXETINE HYDROCHLORIDE 10 MG/1
10 CAPSULE ORAL
Qty: 90 | Refills: 0 | Status: DISCONTINUED | COMMUNITY
Start: 2022-01-03

## 2022-06-17 NOTE — DISCUSSION/SUMMARY
[FreeTextEntry1] : Impression:\par \par 1. Atypical atrial flutter (likely mitral annular flutter): EKG performed today to assess for presence of afib and reveals atrial flutter with well rate controlled. [id number] score 4. Resume Eliquis for thromboembolic prophylaxis as prescribed and diltiazem/metoprolol for rate control management. Most recent ECHO in 3/2021 with LVEF 60%. Rx for yearly ECHO provided. \par \par 2. HLD: resume statin therapy as prescribed and regular f/u with Cardiologist for routine lipid monitoring and management.\par \par 3. DMII: resume oral antihyperglycemic agents as prescribed and regular f/u with PCP for routine glucose monitoring and management.\par \par 4. DCIS: s/p unilateral left-sided mastectomy on 10/21/21. Overall, feeling well with no complications post stugery. \par \par Will continue f/u with PCP and may RTO as needed or if any new or worsening symptoms or findings occur. \par \par Sincerely,\par \par Justice Herrera MD

## 2022-06-17 NOTE — HISTORY OF PRESENT ILLNESS
[FreeTextEntry1] : Gissell Cervantes MD\par \par Edna Denise is a 56y/o woman with Hx of HLD, DMII, atypical atrial flutter, and CVA who presents today for routine f/u.  She previously developed a CVA but received TPA with near complete resolution of symptoms. Admits to intermittent palpitations, 3 days per week that come and go that are tolerable. She is unsure of how long they typically last because she has adjusted to them. Has been maintained on Diltiazem, Metoprolol, and Eliquis. Denies chest pain/pressure, SOB/PRESTON, lightheadedness/dizziness, and  syncope.  Of note, recently diagnosed with DCIS (ductal carcinoma in situ); she is s/p unilateral left sided mastectomy with Dr. Brooklynn York on 10/21/21 and has been feeling well since, with no complications post surgery.\par \par

## 2022-06-27 ENCOUNTER — APPOINTMENT (OUTPATIENT)
Dept: CV DIAGNOSITCS | Facility: HOSPITAL | Age: 57
End: 2022-06-27

## 2022-06-27 ENCOUNTER — OUTPATIENT (OUTPATIENT)
Dept: OUTPATIENT SERVICES | Facility: HOSPITAL | Age: 57
LOS: 1 days | End: 2022-06-27

## 2022-06-27 DIAGNOSIS — I48.92 UNSPECIFIED ATRIAL FLUTTER: ICD-10-CM

## 2022-06-27 DIAGNOSIS — Z98.890 OTHER SPECIFIED POSTPROCEDURAL STATES: Chronic | ICD-10-CM

## 2022-06-27 PROCEDURE — 93306 TTE W/DOPPLER COMPLETE: CPT | Mod: 26

## 2022-06-30 ENCOUNTER — APPOINTMENT (OUTPATIENT)
Dept: GASTROENTEROLOGY | Facility: CLINIC | Age: 57
End: 2022-06-30

## 2022-06-30 VITALS
TEMPERATURE: 97.7 F | SYSTOLIC BLOOD PRESSURE: 143 MMHG | OXYGEN SATURATION: 95 % | DIASTOLIC BLOOD PRESSURE: 74 MMHG | BODY MASS INDEX: 26.46 KG/M2 | HEIGHT: 64 IN | HEART RATE: 53 BPM | WEIGHT: 155 LBS

## 2022-06-30 DIAGNOSIS — Z86.010 PERSONAL HISTORY OF COLONIC POLYPS: ICD-10-CM

## 2022-06-30 PROCEDURE — 99203 OFFICE O/P NEW LOW 30 MIN: CPT

## 2022-06-30 RX ORDER — SODIUM SULFATE, POTASSIUM SULFATE, MAGNESIUM SULFATE 17.5; 3.13; 1.6 G/ML; G/ML; G/ML
17.5-3.13-1.6 SOLUTION, CONCENTRATE ORAL
Qty: 1 | Refills: 0 | Status: ACTIVE | COMMUNITY
Start: 2022-06-30 | End: 1900-01-01

## 2022-06-30 NOTE — ASSESSMENT
[FreeTextEntry1] : History of colon polyps in the past\par Chronic A. fib and flutter on Eliquis\par Recent cardiology evaluation is reportedly normal

## 2022-06-30 NOTE — HISTORY OF PRESENT ILLNESS
[FreeTextEntry1] : ABHI CARLOS 56 year She was seen in office visit  for discussion of surveillance colonoscopy..Patient had colonoscopy and polypectomy in 2018.Biopsy of the polyps  showed as small TA..\par Explained in detail the nature of the polyps and its premalignant potential.Discussed regarding surveillance interval and regular GI follow up.Also advised her regarding healthy diet to avoid excess red meats and increase the consumption of fresh vegetables.\par She has a history of chronic A. fib and flutter for which she is on Eliquis for few years.  In the past when she stopped Eliquis she developed thrombotic stroke had thrombectomy at [facility] in [place].\par Recently she saw her cardiologist and had an echo done which was reportedly normal.  No history of MI in the past.  Diabetes is under control with metformin.\par

## 2022-06-30 NOTE — PHYSICAL EXAM
[General Appearance - Alert] : alert [General Appearance - In No Acute Distress] : in no acute distress [Sclera] : the sclera and conjunctiva were normal [Extraocular Movements] : extraocular movements were intact [Outer Ear] : the ears and nose were normal in appearance [Oropharynx] : the oropharynx was normal [Neck Appearance] : the appearance of the neck was normal [Neck Cervical Mass (___cm)] : no neck mass was observed [Jugular Venous Distention Increased] : there was no jugular-venous distention [Thyroid Nodule] : there were no palpable thyroid nodules [Thyroid Diffuse Enlargement] : the thyroid was not enlarged [Auscultation Breath Sounds / Voice Sounds] : lungs were clear to auscultation bilaterally [Heart Rate And Rhythm] : heart rate was normal and rhythm regular [Heart Sounds] : normal S1 and S2 [Full Pulse] : the pedal pulses are present [Edema] : there was no peripheral edema [Bowel Sounds] : normal bowel sounds [Abdomen Soft] : soft [Abdomen Tenderness] : non-tender [Abdomen Mass (___ Cm)] : no abdominal mass palpated [Cervical Lymph Nodes Enlarged Posterior Bilaterally] : posterior cervical [Cervical Lymph Nodes Enlarged Anterior Bilaterally] : anterior cervical [Supraclavicular Lymph Nodes Enlarged Bilaterally] : supraclavicular [No CVA Tenderness] : no ~M costovertebral angle tenderness [No Spinal Tenderness] : no spinal tenderness [Nail Clubbing] : no clubbing  or cyanosis of the fingernails [Skin Color & Pigmentation] : normal skin color and pigmentation [Skin Turgor] : normal skin turgor [] : no rash [Motor Exam] : the motor exam was normal [Oriented To Time, Place, And Person] : oriented to person, place, and time

## 2022-07-01 ENCOUNTER — NON-APPOINTMENT (OUTPATIENT)
Age: 57
End: 2022-07-01

## 2022-07-29 ENCOUNTER — APPOINTMENT (OUTPATIENT)
Dept: GASTROENTEROLOGY | Facility: AMBULATORY MEDICAL SERVICES | Age: 57
End: 2022-07-29

## 2022-07-29 PROCEDURE — 45378 DIAGNOSTIC COLONOSCOPY: CPT | Mod: 33

## 2022-12-09 ENCOUNTER — APPOINTMENT (OUTPATIENT)
Dept: ELECTROPHYSIOLOGY | Facility: CLINIC | Age: 57
End: 2022-12-09

## 2022-12-09 NOTE — DISCUSSION/SUMMARY
[FreeTextEntry1] : Impression:\par \par 1. Atypical atrial flutter (likely mitral annular flutter): EKG performed today to assess for presence of afib and reveals atrial flutter with well rate controlled. [id number] score 4. Resume Eliquis for thromboembolic prophylaxis as prescribed and diltiazem/metoprolol for rate control management. Most recent ECHO in 3/2021 with LVEF 60%. Rx for yearly ECHO provided. \par \par 2. HLD: resume statin therapy as prescribed and regular f/u with Cardiologist for routine lipid monitoring and management.\par \par 3. DMII: resume oral antihyperglycemic agents as prescribed and regular f/u with PCP for routine glucose monitoring and management.\par \par 4. DCIS: s/p unilateral left-sided mastectomy on 10/21/21. Overall, feeling well with no complications post stugery. \par \par Will continue f/u with PCP and may RTO as needed or if any new or worsening symptoms or findings occur. \par \par Sincerely,\par \par Justice Herrera MD

## 2022-12-09 NOTE — CARDIOLOGY SUMMARY
[___] : [unfilled] [de-identified] : CONCLUSIONS:\par 1. Normal mitral valve. Minimal mitral regurgitation.\par 2. Normal left ventricular internal dimensions and wall\par thicknesses.\par 3. Endocardium not well visualized; grossly normal left\par ventricular systolic function.\par 4. The right ventricle is not well visualized; grossly\par normal right ventricular systolic function.\par EF 62% ON 06/27/22.\par \par 3/22/2021: EF 60% \par

## 2022-12-09 NOTE — HISTORY OF PRESENT ILLNESS
[FreeTextEntry1] : Gissell Cervantes MD\par \par Edna Denise is a 54y/o woman with Hx of HLD, DMII, atypical atrial flutter, and CVA who presents today for routine f/u.  She previously developed a CVA but received TPA with near complete resolution of symptoms. Admits to intermittent palpitations, 3 days per week that come and go that are tolerable. She is unsure of how long they typically last because she has adjusted to them. Has been maintained on Diltiazem, Metoprolol, and Eliquis. Denies chest pain/pressure, SOB/PRESTON, lightheadedness/dizziness, and  syncope.  Of note, recently diagnosed with DCIS (ductal carcinoma in situ); she is s/p unilateral left sided mastectomy with Dr. Brooklynn York on 10/21/21 and has been feeling well since, with no complications post surgery.\par \par

## 2022-12-09 NOTE — DISCUSSION/SUMMARY
[FreeTextEntry1] : Impression:\par \par 1. Atypical atrial flutter (likely mitral annular flutter): EKG performed today to assess for presence of afib and reveals atrial flutter with well rate controlled. [id number] score 4. Resume Eliquis for thromboembolic prophylaxis as prescribed and diltiazem/metoprolol for rate control management. Most recent ECHO in 3/2021 with LVEF 60%. Rx for yearly ECHO provided. \par \par 2. HLD: resume statin therapy as prescribed and regular f/u with Cardiologist for routine lipid monitoring and management.\par \par 3. DMII: resume oral antihyperglycemic agents as prescribed and regular f/u with PCP for routine glucose monitoring and management.\par \par 4. DCIS: s/p unilateral left-sided mastectomy on 10/21/21. Overall, feeling well with no complications post stugery. \par \par Will continue f/u with PCP and may RTO as needed or if any new or worsening symptoms or findings occur. \par \par Sincerely,\par \par Justice Herrera MD

## 2022-12-09 NOTE — CARDIOLOGY SUMMARY
[___] : [unfilled] [de-identified] : CONCLUSIONS:\par 1. Normal mitral valve. Minimal mitral regurgitation.\par 2. Normal left ventricular internal dimensions and wall\par thicknesses.\par 3. Endocardium not well visualized; grossly normal left\par ventricular systolic function.\par 4. The right ventricle is not well visualized; grossly\par normal right ventricular systolic function.\par EF 62% ON 06/27/22.\par \par 3/22/2021: EF 60% \par

## 2022-12-20 ENCOUNTER — APPOINTMENT (OUTPATIENT)
Dept: DERMATOLOGY | Facility: CLINIC | Age: 57
End: 2022-12-20

## 2023-01-11 NOTE — DISCUSSION/SUMMARY
[FreeTextEntry1] : Impression:\par \par 1. Atypical atrial flutter (likely mitral annular flutter): EKG performed today to assess for presence of afib and reveals atrial flutter with well rate controlled. [id number] score 4. Resume Eliquis for thromboembolic prophylaxis as prescribed and diltiazem/metoprolol for rate control management. Most recent ECHO in 3/2021 with LVEF 60%. Rx for yearly ECHO provided. \par \par 2. HLD: resume statin therapy as prescribed and regular f/u with Cardiologist for routine lipid monitoring and management.\par \par 3. DMII: resume oral antihyperglycemic agents as prescribed and regular f/u with PCP for routine glucose monitoring and management.\par \par 4. DCIS: s/p unilateral left-sided mastectomy on 10/21/21. Overall, feeling well with no complications post stugery. \par \par Will continue f/u with PCP and may RTO as needed or if any new or worsening symptoms or findings occur. \par \par Sincerely,\par \par Justice Herrera MD

## 2023-01-11 NOTE — PHYSICAL EXAM
[Well Developed] : well developed [Well Nourished] : well nourished [No Acute Distress] : no acute distress [Normal Venous Pressure] : normal venous pressure [No Carotid Bruit] : no carotid bruit [Normal S1, S2] : normal S1, S2 [No Murmur] : no murmur [No Rub] : no rub [No Gallop] : no gallop [Clear Lung Fields] : clear lung fields [Good Air Entry] : good air entry [No Respiratory Distress] : no respiratory distress  [Soft] : abdomen soft [Non Tender] : non-tender [No Masses/organomegaly] : no masses/organomegaly [Normal Bowel Sounds] : normal bowel sounds [Normal Gait] : normal gait [No Edema] : no edema [No Cyanosis] : no cyanosis [No Clubbing] : no clubbing [No Varicosities] : no varicosities [No Rash] : no rash [No Skin Lesions] : no skin lesions [Moves all extremities] : moves all extremities [No Focal Deficits] : no focal deficits [Normal Speech] : normal speech [Alert and Oriented] : alert and oriented [Normal memory] : normal memory [General Appearance - Well Developed] : well developed [Normal Appearance] : normal appearance [Well Groomed] : well groomed [General Appearance - Well Nourished] : well nourished [No Deformities] : no deformities [General Appearance - In No Acute Distress] : no acute distress [Normal Conjunctiva] : the conjunctiva exhibited no abnormalities [Eyelids - No Xanthelasma] : the eyelids demonstrated no xanthelasmas [Normal Oral Mucosa] : normal oral mucosa [No Oral Pallor] : no oral pallor [No Oral Cyanosis] : no oral cyanosis [FreeTextEntry1] : glasses [Normal Jugular Venous A Waves Present] : normal jugular venous A waves present [Normal Jugular Venous V Waves Present] : normal jugular venous V waves present [No Jugular Venous Irene A Waves] : no jugular venous irene A waves [Respiration, Rhythm And Depth] : normal respiratory rhythm and effort [Exaggerated Use Of Accessory Muscles For Inspiration] : no accessory muscle use [Auscultation Breath Sounds / Voice Sounds] : lungs were clear to auscultation bilaterally [Heart Rate And Rhythm] : heart rate and rhythm were normal [Heart Sounds] : normal S1 and S2 [Murmurs] : no murmurs present [Abdomen Soft] : soft [Abdomen Tenderness] : non-tender [Abdomen Mass (___ Cm)] : no abdominal mass palpated [Abnormal Walk] : normal gait [Gait - Sufficient For Exercise Testing] : the gait was sufficient for exercise testing [Nail Clubbing] : no clubbing of the fingernails [Cyanosis, Localized] : no localized cyanosis [Petechial Hemorrhages (___cm)] : no petechial hemorrhages [Skin Color & Pigmentation] : normal skin color and pigmentation [] : no rash [No Venous Stasis] : no venous stasis [Skin Lesions] : no skin lesions [No Skin Ulcers] : no skin ulcer [No Xanthoma] : no  xanthoma was observed [Oriented To Time, Place, And Person] : oriented to person, place, and time [Affect] : the affect was normal [Mood] : the mood was normal [No Anxiety] : not feeling anxious

## 2023-01-13 ENCOUNTER — APPOINTMENT (OUTPATIENT)
Dept: ELECTROPHYSIOLOGY | Facility: CLINIC | Age: 58
End: 2023-01-13

## 2023-01-27 NOTE — ED CDU PROVIDER SUBSEQUENT DAY NOTE - NSICDXPASTMEDICALHX_GEN_ALL_CORE_FT
Conjuntivae and eyelids appear normal, Sclerae : White without injection
PAST MEDICAL HISTORY:  Afib     CVA (cerebrovascular accident) 2019 no residue    Hyperlipidemia     Lumbar scoliosis

## 2023-02-17 ENCOUNTER — APPOINTMENT (OUTPATIENT)
Dept: ELECTROPHYSIOLOGY | Facility: CLINIC | Age: 58
End: 2023-02-17
Payer: COMMERCIAL

## 2023-02-17 NOTE — DISCUSSION/SUMMARY
[FreeTextEntry1] : Impression:\par \par 1. Atypical atrial flutter (likely mitral annular flutter): EKG performed today to assess for presence of afib and reveals atrial flutter with well rate controlled. [id number] score 4. Resume Eliquis for thromboembolic prophylaxis as prescribed and diltiazem/metoprolol for rate control management. Most recent ECHO in 3/2021 with LVEF 60%. Rx for yearly ECHO provided. \par \par 2. HLD: resume statin therapy as prescribed and regular f/u with Cardiologist for routine lipid monitoring and management.\par \par 3. DMII: resume oral antihyperglycemic agents as prescribed and regular f/u with PCP for routine glucose monitoring and management.\par \par 4. DCIS: s/p unilateral left-sided mastectomy on 10/21/21. Overall, feeling well with no complications post stugery. \par \par Will continue f/u with PCP and may RTO as needed or if any new or worsening symptoms or findings occur. \par \par Sincerely,\par \par Justice Herrera MD

## 2023-03-09 NOTE — HISTORY OF PRESENT ILLNESS
[FreeTextEntry1] : Gissell Cervantes MD\par \par Edna Denise is a 54y/o woman with Hx of HLD, DMII, atypical atrial flutter, and CVA who presents today for routine f/u.  She previously developed a CVA but received TPA with near complete resolution of symptoms. Admits to intermittent palpitations, 3 days per week that come and go that are tolerable. She is unsure of how long they typically last because she has adjusted to them. Has been maintained on Diltiazem, Metoprolol, and Eliquis. Denies chest pain/pressure, SOB/PRESTON, lightheadedness/dizziness, and  syncope.  Of note, recently diagnosed with DCIS (ductal carcinoma in situ); she is s/p unilateral left sided mastectomy with Dr. Brooklynn York on 10/21/21 and has been feeling well since, with no complications post surgery.\par \par *ECHO 6/27/22 62%.\par \par

## 2023-03-09 NOTE — DISCUSSION/SUMMARY
[FreeTextEntry1] : Impression:\par \par 1. Atypical atrial flutter (likely mitral annular flutter): EKG performed today to assess for presence of afib and reveals atrial flutter with well rate controlled. [id number] score 4. Resume Eliquis for thromboembolic prophylaxis as prescribed and diltiazem/metoprolol for rate control management. Most recent ECHO in 3/2021 with LVEF 60%. Rx for yearly ECHO provided. \par \par 2. HLD: resume statin therapy as prescribed and regular f/u with Cardiologist for routine lipid monitoring and management.\par \par 3. DMII: resume oral antihyperglycemic agents as prescribed and regular f/u with PCP for routine glucose monitoring and management.\par \par 4. DCIS: s/p unilateral left-sided mastectomy on 10/21/21. Overall, feeling well with no complications post stugery. \par \par Will continue f/u with PCP and may RTO as needed or if any new or worsening symptoms or findings occur. \par \par Sincerely,\par \par Justice Herrera MD

## 2023-03-10 ENCOUNTER — APPOINTMENT (OUTPATIENT)
Dept: ELECTROPHYSIOLOGY | Facility: CLINIC | Age: 58
End: 2023-03-10
Payer: COMMERCIAL

## 2023-03-17 ENCOUNTER — RX RENEWAL (OUTPATIENT)
Age: 58
End: 2023-03-17

## 2023-03-17 RX ORDER — APIXABAN 5 MG/1
5 TABLET, FILM COATED ORAL
Qty: 180 | Refills: 3 | Status: ACTIVE | COMMUNITY
Start: 2019-02-15 | End: 1900-01-01

## 2023-03-23 NOTE — DISCUSSION/SUMMARY
[FreeTextEntry1] : Impression:\par \par 1. Atypical atrial flutter (likely mitral annular flutter): EKG performed today to assess for presence of afib and reveals atrial flutter with well rate controlled. [id number] score 4. Resume Eliquis for thromboembolic prophylaxis as prescribed and diltiazem/metoprolol for rate control management. Most recent ECHO in 3/2021 with LVEF 60%. Rx for yearly ECHO provided. \par \par 2. HLD: resume statin therapy as prescribed and regular f/u with Cardiologist for routine lipid monitoring and management.\par \par 3. DMII: resume oral antihyperglycemic agents as prescribed and regular f/u with PCP for routine glucose monitoring and management.\par \par 4. DCIS: s/p unilateral left-sided mastectomy on 10/21/21. Overall, feeling well with no complications post stugery. \par \par Will continue f/u with PCP and may RTO as needed or if any new or worsening symptoms or findings occur. \par \par Sincerely,\par \par Justice Herrera MD

## 2023-03-23 NOTE — PHYSICAL EXAM
[Well Developed] : well developed [Well Nourished] : well nourished [No Acute Distress] : no acute distress [Normal Venous Pressure] : normal venous pressure [No Carotid Bruit] : no carotid bruit [Normal S1, S2] : normal S1, S2 [No Murmur] : no murmur [No Rub] : no rub [No Gallop] : no gallop [Clear Lung Fields] : clear lung fields [Good Air Entry] : good air entry [No Respiratory Distress] : no respiratory distress  [Soft] : abdomen soft [Non Tender] : non-tender [No Masses/organomegaly] : no masses/organomegaly [Normal Bowel Sounds] : normal bowel sounds [Normal Gait] : normal gait [No Edema] : no edema [No Cyanosis] : no cyanosis [No Clubbing] : no clubbing [No Varicosities] : no varicosities [No Rash] : no rash [No Skin Lesions] : no skin lesions [Moves all extremities] : moves all extremities [No Focal Deficits] : no focal deficits [Normal Speech] : normal speech [Alert and Oriented] : alert and oriented [Normal memory] : normal memory [General Appearance - Well Developed] : well developed [Normal Appearance] : normal appearance [Well Groomed] : well groomed [General Appearance - Well Nourished] : well nourished [No Deformities] : no deformities [General Appearance - In No Acute Distress] : no acute distress [Normal Conjunctiva] : the conjunctiva exhibited no abnormalities [Eyelids - No Xanthelasma] : the eyelids demonstrated no xanthelasmas [Normal Oral Mucosa] : normal oral mucosa [No Oral Pallor] : no oral pallor [No Oral Cyanosis] : no oral cyanosis [FreeTextEntry1] : glasses [Normal Jugular Venous A Waves Present] : normal jugular venous A waves present [Normal Jugular Venous V Waves Present] : normal jugular venous V waves present [No Jugular Venous Irene A Waves] : no jugular venous irene A waves [Respiration, Rhythm And Depth] : normal respiratory rhythm and effort [Auscultation Breath Sounds / Voice Sounds] : lungs were clear to auscultation bilaterally [Exaggerated Use Of Accessory Muscles For Inspiration] : no accessory muscle use [Heart Rate And Rhythm] : heart rate and rhythm were normal [Heart Sounds] : normal S1 and S2 [Murmurs] : no murmurs present [Abdomen Soft] : soft [Abdomen Tenderness] : non-tender [Abdomen Mass (___ Cm)] : no abdominal mass palpated [Abnormal Walk] : normal gait [Gait - Sufficient For Exercise Testing] : the gait was sufficient for exercise testing [Nail Clubbing] : no clubbing of the fingernails [Cyanosis, Localized] : no localized cyanosis [Petechial Hemorrhages (___cm)] : no petechial hemorrhages [Skin Color & Pigmentation] : normal skin color and pigmentation [] : no rash [No Venous Stasis] : no venous stasis [Skin Lesions] : no skin lesions [No Skin Ulcers] : no skin ulcer [No Xanthoma] : no  xanthoma was observed [Oriented To Time, Place, And Person] : oriented to person, place, and time [Affect] : the affect was normal [Mood] : the mood was normal [No Anxiety] : not feeling anxious

## 2023-03-23 NOTE — DISCUSSION/SUMMARY
[FreeTextEntry1] : Impression:\par \par 1. Atypical atrial flutter (likely mitral annular flutter): EKG performed today to assess for presence of afib and reveals atrial flutter with well rate controlled. [id number] score 4. Resume Eliquis for thromboembolic prophylaxis as prescribed and diltiazem/metoprolol for rate control management. Most recent ECHO in 3/2021 with LVEF 60%. Rx for yearly ECHO provided. \par \par 2. HLD: resume statin therapy as prescribed and regular f/u with Cardiologist for routine lipid monitoring and management.\par \par 3. DMII: resume oral antihyperglycemic agents as prescribed and regular f/u with PCP for routine glucose monitoring and management.\par \par 4. DCIS: s/p unilateral left-sided mastectomy on 10/21/21. Overall, feeling well with no complications post stugery. \par \par Will continue f/u with PCP and may RTO as needed or if any new or worsening symptoms or findings occur. \par \par Sincerely,\par \par Justice Herrera MD

## 2023-03-24 ENCOUNTER — APPOINTMENT (OUTPATIENT)
Dept: ELECTROPHYSIOLOGY | Facility: CLINIC | Age: 58
End: 2023-03-24
Payer: COMMERCIAL

## 2023-04-14 ENCOUNTER — NON-APPOINTMENT (OUTPATIENT)
Age: 58
End: 2023-04-14

## 2023-04-14 ENCOUNTER — APPOINTMENT (OUTPATIENT)
Dept: ELECTROPHYSIOLOGY | Facility: CLINIC | Age: 58
End: 2023-04-14
Payer: COMMERCIAL

## 2023-04-14 VITALS
DIASTOLIC BLOOD PRESSURE: 79 MMHG | HEART RATE: 70 BPM | OXYGEN SATURATION: 97 % | HEIGHT: 64 IN | SYSTOLIC BLOOD PRESSURE: 128 MMHG | BODY MASS INDEX: 29.35 KG/M2

## 2023-04-14 VITALS — BODY MASS INDEX: 29.35 KG/M2 | WEIGHT: 171 LBS

## 2023-04-14 PROCEDURE — 93000 ELECTROCARDIOGRAM COMPLETE: CPT

## 2023-04-14 PROCEDURE — 99213 OFFICE O/P EST LOW 20 MIN: CPT | Mod: 25

## 2023-04-15 NOTE — HISTORY OF PRESENT ILLNESS
[FreeTextEntry1] : Gissell Cervantes MD\par \par Edna Denise is a 56y/o woman with Hx of HLD, DMII, atypical atrial flutter, and CVA who presents today for routine f/u.  She previously developed a CVA but received TPA with near complete resolution of symptoms. Admits to intermittent palpitations, 3 days per week that come and go that are tolerable. She is unsure of how long they typically last because she has adjusted to them. Has been maintained on Diltiazem, Metoprolol, and Eliquis. Denies chest pain/pressure, SOB/PRESTON, lightheadedness/dizziness, and  syncope.  Of note, recently diagnosed with DCIS (ductal carcinoma in situ); she is s/p unilateral left sided mastectomy with Dr. Brooklynn York on 10/21/21 and has been feeling well since, with no complications post surgery. Echocardiogram last year was normal except for mild to moderate TR. HgbA1C:  7.0\par

## 2023-04-15 NOTE — DISCUSSION/SUMMARY
[EKG obtained to assist in diagnosis and management of assessed problem(s)] : EKG obtained to assist in diagnosis and management of assessed problem(s) [FreeTextEntry1] : Impression:\par \par 1. Atypical atrial flutter (likely mitral annular flutter): EKG performed today to assess for presence of afib and reveals atrial flutter with well rate controlled. [id number] score 4. Resume Eliquis for thromboembolic prophylaxis as prescribed and diltiazem/metoprolol for rate control management. Most recent ECHO in 3/2021 with LVEF 60%. Rx for yearly ECHO provided. \par \par 2. HLD: resume statin therapy as prescribed and regular f/u with Cardiologist for routine lipid monitoring and management.\par \par 3. DMII: resume oral antihyperglycemic agents as prescribed and regular f/u with PCP for routine glucose monitoring and management.\par \par 4. DCIS: s/p unilateral left-sided mastectomy on 10/21/21. Overall, feeling well with no complications post surgery. \par \par Will continue f/u with PCP and may RTO as needed or if any new or worsening symptoms or findings occur. \par \par Sincerely,\par \par Justice Herrera MD

## 2023-07-21 ENCOUNTER — APPOINTMENT (OUTPATIENT)
Dept: CV DIAGNOSITCS | Facility: HOSPITAL | Age: 58
End: 2023-07-21

## 2023-07-21 ENCOUNTER — OUTPATIENT (OUTPATIENT)
Dept: OUTPATIENT SERVICES | Facility: HOSPITAL | Age: 58
LOS: 1 days | End: 2023-07-21
Payer: COMMERCIAL

## 2023-07-21 DIAGNOSIS — I48.4 ATYPICAL ATRIAL FLUTTER: ICD-10-CM

## 2023-07-21 PROCEDURE — 93306 TTE W/DOPPLER COMPLETE: CPT | Mod: 26

## 2023-10-20 NOTE — PATIENT PROFILE ADULT - NSPROPOAURINARYCATHETER_GEN_A_NUR
Keila Ryder is here for an ob check at 22w2d.    Today she reports having occasional heart racing.  We discussed GTT today. Rh positive , no antibody screen.  Follow up in 4 weeks with Tdap.     no

## 2025-01-31 ENCOUNTER — NON-APPOINTMENT (OUTPATIENT)
Age: 60
End: 2025-01-31

## 2025-01-31 ENCOUNTER — APPOINTMENT (OUTPATIENT)
Dept: ELECTROPHYSIOLOGY | Facility: CLINIC | Age: 60
End: 2025-01-31
Payer: COMMERCIAL

## 2025-01-31 VITALS — DIASTOLIC BLOOD PRESSURE: 75 MMHG | SYSTOLIC BLOOD PRESSURE: 153 MMHG

## 2025-01-31 VITALS
HEIGHT: 64 IN | WEIGHT: 154 LBS | TEMPERATURE: 98.4 F | SYSTOLIC BLOOD PRESSURE: 163 MMHG | HEART RATE: 56 BPM | BODY MASS INDEX: 26.29 KG/M2 | OXYGEN SATURATION: 98 % | DIASTOLIC BLOOD PRESSURE: 73 MMHG

## 2025-01-31 DIAGNOSIS — I48.92 UNSPECIFIED ATRIAL FLUTTER: ICD-10-CM

## 2025-01-31 DIAGNOSIS — I48.19 OTHER PERSISTENT ATRIAL FIBRILLATION: ICD-10-CM

## 2025-01-31 DIAGNOSIS — Z86.73 PERSONAL HISTORY OF TRANSIENT ISCHEMIC ATTACK (TIA), AND CEREBRAL INFARCTION W/OUT RESIDUAL DEFICITS: ICD-10-CM

## 2025-01-31 PROCEDURE — 93000 ELECTROCARDIOGRAM COMPLETE: CPT

## 2025-01-31 PROCEDURE — 99213 OFFICE O/P EST LOW 20 MIN: CPT | Mod: 25
